# Patient Record
Sex: FEMALE | Race: WHITE | URBAN - METROPOLITAN AREA
[De-identification: names, ages, dates, MRNs, and addresses within clinical notes are randomized per-mention and may not be internally consistent; named-entity substitution may affect disease eponyms.]

---

## 2021-09-20 ENCOUNTER — OFFICE VISIT (OUTPATIENT)
Dept: URBAN - METROPOLITAN AREA CLINIC 51 | Facility: CLINIC | Age: 61
End: 2021-09-20
Payer: COMMERCIAL

## 2021-09-20 DIAGNOSIS — H40.1131 PRIMARY OPEN-ANGLE GLAUCOMA, MILD STAGE, BILATERAL: ICD-10-CM

## 2021-09-20 DIAGNOSIS — H34.8120 CENTRAL RETINAL VEIN OCCLUSION W/ MACULAR EDEMA, LEFT EYE: Primary | ICD-10-CM

## 2021-09-20 PROCEDURE — 67028 INJECTION EYE DRUG: CPT | Performed by: OPHTHALMOLOGY

## 2021-09-20 PROCEDURE — 99204 OFFICE O/P NEW MOD 45 MIN: CPT | Performed by: OPHTHALMOLOGY

## 2021-09-20 PROCEDURE — 92235 FLUORESCEIN ANGRPH MLTIFRAME: CPT | Performed by: OPHTHALMOLOGY

## 2021-09-20 PROCEDURE — 92134 CPTRZ OPH DX IMG PST SGM RTA: CPT | Performed by: OPHTHALMOLOGY

## 2021-09-20 RX ORDER — TRAVOPROST OPHTHALMIC SOLUTION, 0.004% 0.04 MG/ML
0.004 % SOLUTION/ DROPS OPHTHALMIC
Qty: 5 | Refills: 12 | Status: ACTIVE
Start: 2021-09-20

## 2021-09-20 ASSESSMENT — INTRAOCULAR PRESSURE
OS: 15
OD: 12

## 2021-09-20 NOTE — IMPRESSION/PLAN
Impression: Central Retinal Vein Occlusion, OS. Plan: Diagnosed in 2017. Vision is stable. Exam, OCT, and FA reveal a CRVO with CME. Inject Avastin, as necessary. Consider SDM MicroPulse laser. Schedule: 6 Week Reeval, Dil OU, OCT, RNFL. Schedule: Glaucoma consult.

## 2021-09-20 NOTE — IMPRESSION/PLAN
Impression: Glaucoma, OU. Plan: Travatan OU, Considered a suspect, will establish glaucoma care here.

## 2021-12-01 ENCOUNTER — OFFICE VISIT (OUTPATIENT)
Dept: URBAN - METROPOLITAN AREA CLINIC 51 | Facility: CLINIC | Age: 61
End: 2021-12-01
Payer: COMMERCIAL

## 2021-12-01 PROCEDURE — 92134 CPTRZ OPH DX IMG PST SGM RTA: CPT | Performed by: OPHTHALMOLOGY

## 2021-12-01 PROCEDURE — 99214 OFFICE O/P EST MOD 30 MIN: CPT | Performed by: OPHTHALMOLOGY

## 2021-12-01 ASSESSMENT — INTRAOCULAR PRESSURE
OS: 22
OD: 22

## 2021-12-01 NOTE — IMPRESSION/PLAN
Impression: Central Retinal Vein Occlusion, OS.
s/p Avastin 09/20/21 Plan: Diagnosed in 2017. Vision is stable. Exam, OCT, and FA reveal a CRVO with CME. Continue Avastin, as necessary. Schedule SDM MicroPulse laser. Schedule: 1-4 Week, Dil OS, OCT, RNFL, Laser 68081 OS.

## 2022-01-19 ENCOUNTER — OFFICE VISIT (OUTPATIENT)
Dept: URBAN - METROPOLITAN AREA CLINIC 51 | Facility: CLINIC | Age: 62
End: 2022-01-19
Payer: COMMERCIAL

## 2022-01-19 PROCEDURE — 92134 CPTRZ OPH DX IMG PST SGM RTA: CPT | Performed by: OPHTHALMOLOGY

## 2022-01-19 PROCEDURE — 99214 OFFICE O/P EST MOD 30 MIN: CPT | Performed by: OPHTHALMOLOGY

## 2022-01-19 PROCEDURE — 67028 INJECTION EYE DRUG: CPT | Performed by: OPHTHALMOLOGY

## 2022-01-19 ASSESSMENT — INTRAOCULAR PRESSURE
OS: 19
OD: 20

## 2022-01-19 NOTE — IMPRESSION/PLAN
Impression: Central Retinal Vein Occlusion, OS.
s/p Avastin 09/20/21 Plan: Diagnosed in 2017. Vision is stable. Exam, OCT, and FA reveal a CRVO with CME. Inject Avastin, FOR CME. Hold off on SDM MicroPulse laser. 

Schedule: 2 MONTHS , Dil OS, OCT, RNFL, AVASTIN

## 2022-04-07 ENCOUNTER — PROCEDURE (OUTPATIENT)
Dept: URBAN - METROPOLITAN AREA CLINIC 51 | Facility: CLINIC | Age: 62
End: 2022-04-07
Payer: COMMERCIAL

## 2022-04-07 PROCEDURE — 92134 CPTRZ OPH DX IMG PST SGM RTA: CPT | Performed by: OPHTHALMOLOGY

## 2022-04-07 PROCEDURE — 99214 OFFICE O/P EST MOD 30 MIN: CPT | Performed by: OPHTHALMOLOGY

## 2022-04-07 ASSESSMENT — INTRAOCULAR PRESSURE
OS: 18
OD: 15

## 2022-04-07 NOTE — IMPRESSION/PLAN
Impression: Central Retinal Vein Occlusion, OS.
s/p Avastin 09/20/21 Plan: Diagnosed in 2017. Vision is stable. Exam, OCT, and FA reveal a CRVO with CME. Inject Avastin, FOR CME doing well . Hold off on SDM MicroPulse laser.

## 2022-07-06 ENCOUNTER — OFFICE VISIT (OUTPATIENT)
Dept: URBAN - METROPOLITAN AREA CLINIC 51 | Facility: CLINIC | Age: 62
End: 2022-07-06
Payer: COMMERCIAL

## 2022-07-06 DIAGNOSIS — H40.1131 PRIMARY OPEN-ANGLE GLAUCOMA, MILD STAGE, BILATERAL: ICD-10-CM

## 2022-07-06 DIAGNOSIS — H34.8120 CENTRAL RETINAL VEIN OCCLUSION W/ MACULAR EDEMA, LEFT EYE: Primary | ICD-10-CM

## 2022-07-06 PROCEDURE — 92134 CPTRZ OPH DX IMG PST SGM RTA: CPT | Performed by: OPHTHALMOLOGY

## 2022-07-06 PROCEDURE — 92235 FLUORESCEIN ANGRPH MLTIFRAME: CPT | Performed by: OPHTHALMOLOGY

## 2022-07-06 PROCEDURE — 99212 OFFICE O/P EST SF 10 MIN: CPT | Performed by: OPHTHALMOLOGY

## 2022-07-06 PROCEDURE — 67028 INJECTION EYE DRUG: CPT | Performed by: OPHTHALMOLOGY

## 2022-07-06 ASSESSMENT — INTRAOCULAR PRESSURE
OS: 21
OD: 17

## 2022-07-06 NOTE — IMPRESSION/PLAN
Impression: Central Retinal Vein Occlusion, OS.
s/p Avastin 09/20/21 Plan: Diagnosed in 2017. Vision is stable. Exam, OCT, and FA reveal a CRVO with CME. Inject Avastin, FOR CME doing well. cost of laser an obstacle, plus past cancer later SDM MicroPulse laser.

## 2022-09-28 ENCOUNTER — OFFICE VISIT (OUTPATIENT)
Dept: URBAN - METROPOLITAN AREA CLINIC 51 | Facility: CLINIC | Age: 62
End: 2022-09-28
Payer: COMMERCIAL

## 2022-09-28 DIAGNOSIS — H40.1131 PRIMARY OPEN-ANGLE GLAUCOMA, MILD STAGE, BILATERAL: ICD-10-CM

## 2022-09-28 DIAGNOSIS — H34.8120 CENTRAL RETINAL VEIN OCCLUSION W/ MACULAR EDEMA, LEFT EYE: Primary | ICD-10-CM

## 2022-09-28 PROCEDURE — 99212 OFFICE O/P EST SF 10 MIN: CPT | Performed by: OPHTHALMOLOGY

## 2022-09-28 PROCEDURE — 67028 INJECTION EYE DRUG: CPT | Performed by: OPHTHALMOLOGY

## 2022-09-28 PROCEDURE — 92134 CPTRZ OPH DX IMG PST SGM RTA: CPT | Performed by: OPHTHALMOLOGY

## 2022-09-28 ASSESSMENT — INTRAOCULAR PRESSURE
OS: 22
OD: 21

## 2022-09-28 NOTE — IMPRESSION/PLAN
Impression: Central Retinal Vein Occlusion, OS. Plan: Diagnosed in 2017. Vision is stable. Exam, OCT, and FA reveal a CRVO with CME. Inject Avastin OS for CME.  I suggest laser micropulse as adjunct for CME CRVO

## 2022-09-28 NOTE — IMPRESSION/PLAN
Impression: Glaucoma, OU.  Plan: Travatan OU, Considered a suspect, will establish glaucoma care here,  [facility]

## 2022-11-18 ENCOUNTER — OFFICE VISIT (OUTPATIENT)
Dept: URBAN - METROPOLITAN AREA CLINIC 51 | Facility: CLINIC | Age: 62
End: 2022-11-18
Payer: COMMERCIAL

## 2022-11-18 DIAGNOSIS — H35.352 CYSTOID MACULAR DEGENERATION, LEFT EYE: ICD-10-CM

## 2022-11-18 DIAGNOSIS — H25.13 AGE-RELATED NUCLEAR CATARACT, BILATERAL: ICD-10-CM

## 2022-11-18 DIAGNOSIS — H40.1121 PRIMARY OPEN-ANGLE GLAUCOMA, MILD STAGE, LEFT EYE: Primary | ICD-10-CM

## 2022-11-18 DIAGNOSIS — H40.051 OCULAR HYPERTENSION, RIGHT EYE: ICD-10-CM

## 2022-11-18 PROCEDURE — 76514 ECHO EXAM OF EYE THICKNESS: CPT | Performed by: STUDENT IN AN ORGANIZED HEALTH CARE EDUCATION/TRAINING PROGRAM

## 2022-11-18 PROCEDURE — 92083 EXTENDED VISUAL FIELD XM: CPT | Performed by: STUDENT IN AN ORGANIZED HEALTH CARE EDUCATION/TRAINING PROGRAM

## 2022-11-18 PROCEDURE — 99214 OFFICE O/P EST MOD 30 MIN: CPT | Performed by: STUDENT IN AN ORGANIZED HEALTH CARE EDUCATION/TRAINING PROGRAM

## 2022-11-18 PROCEDURE — 92020 GONIOSCOPY: CPT | Performed by: STUDENT IN AN ORGANIZED HEALTH CARE EDUCATION/TRAINING PROGRAM

## 2022-11-18 PROCEDURE — 92133 CPTRZD OPH DX IMG PST SGM ON: CPT | Performed by: STUDENT IN AN ORGANIZED HEALTH CARE EDUCATION/TRAINING PROGRAM

## 2022-11-18 RX ORDER — DORZOLAMIDE HYDROCHLORIDE AND TIMOLOL MALEATE 20; 5 MG/ML; MG/ML
SOLUTION/ DROPS OPHTHALMIC
Qty: 10 | Refills: 5 | Status: ACTIVE
Start: 2022-11-18

## 2022-11-18 ASSESSMENT — INTRAOCULAR PRESSURE
OS: 23
OD: 21

## 2022-11-18 NOTE — IMPRESSION/PLAN
Impression: Primary open-angle glaucoma, mild stage, left eye: H40.1121. Preperimetric Plan: Pachy: 554/550 Tmax:  22/23 Target IOP: high teens OU Med Allergies: none Heart / Lung / Kidney disease/sulfa allergy: Pt. denies Gonioscopy: grade 3, 2+ TMP OU
OCT: 86 normal/80 sup thinning HVF: MD -0 full ou
C/D: .4/.6 Better seeing eye:  OD
IOP Today: 21/23 Plan: IOP is above target. Explained to patient that based off testing and IOP early glaucoma present OS. Recommend lowering IOP. Drops: STOP Travoprost (CME), START Dorzolamide-Timolol BID OU. Discussed natural history of glaucoma and that irreversible vision loss can occur without adequate IOP control. Emphasized compliance with eyedrops and other treatment described above.

## 2022-11-30 ENCOUNTER — OFFICE VISIT (OUTPATIENT)
Dept: URBAN - METROPOLITAN AREA CLINIC 51 | Facility: CLINIC | Age: 62
End: 2022-11-30
Payer: COMMERCIAL

## 2022-11-30 DIAGNOSIS — H40.1131 PRIMARY OPEN-ANGLE GLAUCOMA, MILD STAGE, BILATERAL: ICD-10-CM

## 2022-11-30 DIAGNOSIS — H34.8120 CENTRAL RETINAL VEIN OCCLUSION W/ MACULAR EDEMA, LEFT EYE: Primary | ICD-10-CM

## 2022-11-30 PROCEDURE — 99212 OFFICE O/P EST SF 10 MIN: CPT | Performed by: OPHTHALMOLOGY

## 2022-11-30 PROCEDURE — 92134 CPTRZ OPH DX IMG PST SGM RTA: CPT | Performed by: OPHTHALMOLOGY

## 2022-11-30 PROCEDURE — 67028 INJECTION EYE DRUG: CPT | Performed by: OPHTHALMOLOGY

## 2022-11-30 PROCEDURE — 92235 FLUORESCEIN ANGRPH MLTIFRAME: CPT | Performed by: OPHTHALMOLOGY

## 2022-11-30 ASSESSMENT — INTRAOCULAR PRESSURE
OD: 8
OS: 10

## 2022-11-30 NOTE — IMPRESSION/PLAN
Impression: Central Retinal Vein Occlusion, OS. Plan: Diagnosed in 2017. Vision is stable. Exam, OCT, and FA reveal a CRVO with CME. Inject Avastin OS for CME, today . I suggest laser micropulse as adjunct for CME CRVO, [id number] micropulse.

## 2022-11-30 NOTE — IMPRESSION/PLAN
Impression: Glaucoma, OU.  Plan:  OU, Considered a suspect, will establish glaucoma care here,  [facility]

## 2023-01-13 ENCOUNTER — OFFICE VISIT (OUTPATIENT)
Dept: URBAN - METROPOLITAN AREA CLINIC 51 | Facility: CLINIC | Age: 63
End: 2023-01-13
Payer: COMMERCIAL

## 2023-01-13 DIAGNOSIS — H40.051 OCULAR HYPERTENSION, RIGHT EYE: ICD-10-CM

## 2023-01-13 DIAGNOSIS — H40.1121 PRIMARY OPEN-ANGLE GLAUCOMA, MILD STAGE, LEFT EYE: Primary | ICD-10-CM

## 2023-01-13 PROCEDURE — 99213 OFFICE O/P EST LOW 20 MIN: CPT | Performed by: STUDENT IN AN ORGANIZED HEALTH CARE EDUCATION/TRAINING PROGRAM

## 2023-01-13 ASSESSMENT — INTRAOCULAR PRESSURE
OS: 18
OD: 17

## 2023-01-13 NOTE — IMPRESSION/PLAN
Impression: Primary open-angle glaucoma, mild stage, left eye: H40.1121. Preperimetric
- AVOID PGAs(CME) Plan: Pachy: 554/550 Tmax:  22/23 Target IOP: high teens OU Med Allergies: none Heart / Lung / Kidney disease/sulfa allergy: Pt. denies Gonioscopy: grade 3, 2+ TMP OU
OCT: 86 normal/80 sup thinning HVF: MD -0 full ou
C/D: .4/.6 Better seeing eye:  OD
IOP Today: 17/18 Plan: IOP is at goal OU. No changes being made with drop regimen. Will continue to monitor IOP. Drops: CONTINUE Dorzolamide-Timolol BID OU. Discussed natural history of glaucoma and that irreversible vision loss can occur without adequate IOP control. Emphasized compliance with eyedrops and other treatment described above.

## 2023-01-31 ENCOUNTER — OFFICE VISIT (OUTPATIENT)
Dept: URBAN - METROPOLITAN AREA CLINIC 51 | Facility: CLINIC | Age: 63
End: 2023-01-31
Payer: COMMERCIAL

## 2023-01-31 DIAGNOSIS — H34.8120 CENTRAL RETINAL VEIN OCCLUSION W/ MACULAR EDEMA, LEFT EYE: Primary | ICD-10-CM

## 2023-01-31 PROCEDURE — 67028 INJECTION EYE DRUG: CPT | Performed by: OPHTHALMOLOGY

## 2023-01-31 PROCEDURE — 92134 CPTRZ OPH DX IMG PST SGM RTA: CPT | Performed by: OPHTHALMOLOGY

## 2023-01-31 ASSESSMENT — INTRAOCULAR PRESSURE
OD: 18
OS: 17

## 2023-01-31 NOTE — IMPRESSION/PLAN
Impression: Central Retinal Vein Occlusion, OS. Plan: inject Avastin OS  **increased CME x 6 months, recommend return sooner RTC 1 month Dr Alda Love

## 2023-04-03 ENCOUNTER — OFFICE VISIT (OUTPATIENT)
Dept: URBAN - METROPOLITAN AREA CLINIC 51 | Facility: CLINIC | Age: 63
End: 2023-04-03
Payer: COMMERCIAL

## 2023-04-03 DIAGNOSIS — H40.1131 PRIMARY OPEN-ANGLE GLAUCOMA, MILD STAGE, BILATERAL: ICD-10-CM

## 2023-04-03 DIAGNOSIS — H34.8120 CENTRAL RETINAL VEIN OCCLUSION W/ MACULAR EDEMA, LEFT EYE: Primary | ICD-10-CM

## 2023-04-03 PROCEDURE — 99212 OFFICE O/P EST SF 10 MIN: CPT | Performed by: OPHTHALMOLOGY

## 2023-04-03 PROCEDURE — 92235 FLUORESCEIN ANGRPH MLTIFRAME: CPT | Performed by: OPHTHALMOLOGY

## 2023-04-03 PROCEDURE — 67028 INJECTION EYE DRUG: CPT | Performed by: OPHTHALMOLOGY

## 2023-04-03 PROCEDURE — 92134 CPTRZ OPH DX IMG PST SGM RTA: CPT | Performed by: OPHTHALMOLOGY

## 2023-04-03 ASSESSMENT — INTRAOCULAR PRESSURE
OS: 10
OD: 12

## 2023-04-03 NOTE — IMPRESSION/PLAN
Impression: Glaucoma, OU.  Plan: OU, Considered a suspect, will establish glaucoma care here,  [facility]

## 2023-04-03 NOTE — IMPRESSION/PLAN
Impression: Central Retinal Vein Occlusion, OS. Plan: Diagnosed in 2017. Vision is stable. Exam, OCT, and FA reveal a CRVO with  resolved CME. Inject Avastin OS for CME, today . I suggest laser micropulse as adjunct for CME CRVO, [id number] micropulse.

## 2023-06-01 ENCOUNTER — OFFICE VISIT (OUTPATIENT)
Dept: URBAN - METROPOLITAN AREA CLINIC 51 | Facility: CLINIC | Age: 63
End: 2023-06-01
Payer: COMMERCIAL

## 2023-06-01 DIAGNOSIS — H34.8120 CENTRAL RETINAL VEIN OCCLUSION W/ MACULAR EDEMA, LEFT EYE: Primary | ICD-10-CM

## 2023-06-01 DIAGNOSIS — H40.1131 PRIMARY OPEN-ANGLE GLAUCOMA, MILD STAGE, BILATERAL: ICD-10-CM

## 2023-06-01 PROCEDURE — 92134 CPTRZ OPH DX IMG PST SGM RTA: CPT | Performed by: OPHTHALMOLOGY

## 2023-06-01 PROCEDURE — 99212 OFFICE O/P EST SF 10 MIN: CPT | Performed by: OPHTHALMOLOGY

## 2023-06-01 PROCEDURE — 67028 INJECTION EYE DRUG: CPT | Performed by: OPHTHALMOLOGY

## 2023-06-01 PROCEDURE — 67210 TREATMENT OF RETINAL LESION: CPT | Performed by: OPHTHALMOLOGY

## 2023-06-01 ASSESSMENT — INTRAOCULAR PRESSURE
OS: 13
OD: 14

## 2023-06-01 NOTE — IMPRESSION/PLAN
Impression: Glaucoma, OU.  Plan: OU, Considered a suspect, will establish glaucoma care here,  [facility]

## 2023-06-01 NOTE — IMPRESSION/PLAN
Impression: Central Retinal Vein Occlusion, OS. Plan: Diagnosed in 2017. Vision is stable. Exam, OCT, and FA reveal a CRVO with CME. Inject Avastin OS for CME, today .  I suggest laser micropulse as adjunct for CME CRVO, [id number] micropulse.,she will do in Jan 2023

## 2023-07-14 ENCOUNTER — OFFICE VISIT (OUTPATIENT)
Dept: URBAN - METROPOLITAN AREA CLINIC 51 | Facility: CLINIC | Age: 63
End: 2023-07-14
Payer: COMMERCIAL

## 2023-07-14 DIAGNOSIS — H40.051 OCULAR HYPERTENSION, RIGHT EYE: ICD-10-CM

## 2023-07-14 DIAGNOSIS — H40.1121 PRIMARY OPEN-ANGLE GLAUCOMA, MILD STAGE, LEFT EYE: Primary | ICD-10-CM

## 2023-07-14 PROCEDURE — 99213 OFFICE O/P EST LOW 20 MIN: CPT | Performed by: STUDENT IN AN ORGANIZED HEALTH CARE EDUCATION/TRAINING PROGRAM

## 2023-07-14 PROCEDURE — 92083 EXTENDED VISUAL FIELD XM: CPT | Performed by: STUDENT IN AN ORGANIZED HEALTH CARE EDUCATION/TRAINING PROGRAM

## 2023-07-14 ASSESSMENT — INTRAOCULAR PRESSURE
OS: 14
OD: 15

## 2023-07-14 NOTE — IMPRESSION/PLAN
Impression: Primary open-angle glaucoma, mild stage, left eye: H40.1121. Preperimetric
- AVOID PGAs(CME) Plan: Pachy: 554/550 Tmax:  22/23 Target IOP: high teens OU Med Allergies: none Heart / Lung / Kidney disease/sulfa allergy: Pt. denies Gonioscopy: grade 3, 2+ TMP OU
OCT: 86 normal/80 sup thinning HVF: OD: MD -0 full (excessive high false pos)/OS: MD -2 EBS, grossly full C/D: .4/.6 Better seeing eye:  OD
IOP Today: 15/14 Plan: IOP and VF are stable OU. No changes are being made to treatment at this time. Will continue to monitor IOP and testing. Drops: CONTINUE Dorzolamide-Timolol BID OU. Discussed natural history of glaucoma and that irreversible vision loss can occur without adequate IOP control. Emphasized compliance with eyedrops and other treatment described above.

## 2023-08-02 ENCOUNTER — OFFICE VISIT (OUTPATIENT)
Dept: URBAN - METROPOLITAN AREA CLINIC 51 | Facility: CLINIC | Age: 63
End: 2023-08-02
Payer: COMMERCIAL

## 2023-08-02 DIAGNOSIS — H40.1131 PRIMARY OPEN-ANGLE GLAUCOMA, MILD STAGE, BILATERAL: ICD-10-CM

## 2023-08-02 DIAGNOSIS — H34.8120 CENTRAL RETINAL VEIN OCCLUSION W/ MACULAR EDEMA, LEFT EYE: Primary | ICD-10-CM

## 2023-08-02 PROCEDURE — 67028 INJECTION EYE DRUG: CPT | Performed by: OPHTHALMOLOGY

## 2023-08-02 PROCEDURE — 99212 OFFICE O/P EST SF 10 MIN: CPT | Performed by: OPHTHALMOLOGY

## 2023-08-02 PROCEDURE — 92134 CPTRZ OPH DX IMG PST SGM RTA: CPT | Performed by: OPHTHALMOLOGY

## 2023-08-02 ASSESSMENT — INTRAOCULAR PRESSURE
OS: 16
OD: 14

## 2023-11-16 ENCOUNTER — OFFICE VISIT (OUTPATIENT)
Dept: URBAN - METROPOLITAN AREA CLINIC 51 | Facility: CLINIC | Age: 63
End: 2023-11-16
Payer: COMMERCIAL

## 2023-11-16 DIAGNOSIS — H40.1131 PRIMARY OPEN-ANGLE GLAUCOMA, MILD STAGE, BILATERAL: ICD-10-CM

## 2023-11-16 PROCEDURE — 99212 OFFICE O/P EST SF 10 MIN: CPT | Performed by: OPHTHALMOLOGY

## 2023-11-16 PROCEDURE — 92134 CPTRZ OPH DX IMG PST SGM RTA: CPT | Performed by: OPHTHALMOLOGY

## 2023-11-16 PROCEDURE — 67028 INJECTION EYE DRUG: CPT | Performed by: OPHTHALMOLOGY

## 2024-02-05 ENCOUNTER — OFFICE VISIT (OUTPATIENT)
Dept: URBAN - METROPOLITAN AREA CLINIC 44 | Facility: CLINIC | Age: 64
End: 2024-02-05
Payer: COMMERCIAL

## 2024-02-05 DIAGNOSIS — H25.13 AGE-RELATED NUCLEAR CATARACT, BILATERAL: ICD-10-CM

## 2024-02-05 DIAGNOSIS — H40.051 OCULAR HYPERTENSION, RIGHT EYE: ICD-10-CM

## 2024-02-05 DIAGNOSIS — H34.8120 CENTRAL RETINAL VEIN OCCLUSION W/ MACULAR EDEMA, LEFT EYE: ICD-10-CM

## 2024-02-05 DIAGNOSIS — H04.123 DRY EYE SYNDROME OF BILATERAL LACRIMAL GLANDS: ICD-10-CM

## 2024-02-05 DIAGNOSIS — H40.1121 PRIMARY OPEN-ANGLE GLAUCOMA, MILD STAGE, LEFT EYE: Primary | ICD-10-CM

## 2024-02-05 PROCEDURE — 99213 OFFICE O/P EST LOW 20 MIN: CPT | Performed by: OPHTHALMOLOGY

## 2024-02-05 PROCEDURE — 92020 GONIOSCOPY: CPT | Performed by: OPHTHALMOLOGY

## 2024-02-05 RX ORDER — DORZOLAMIDE HYDROCHLORIDE AND TIMOLOL MALEATE 20; 5 MG/ML; MG/ML
SOLUTION/ DROPS OPHTHALMIC
Qty: 10 | Refills: 3 | Status: ACTIVE
Start: 2024-02-05

## 2024-02-05 ASSESSMENT — INTRAOCULAR PRESSURE
OD: 16
OS: 17

## 2024-03-05 ENCOUNTER — OFFICE VISIT (OUTPATIENT)
Dept: URBAN - METROPOLITAN AREA CLINIC 44 | Facility: CLINIC | Age: 64
End: 2024-03-05
Payer: COMMERCIAL

## 2024-03-05 DIAGNOSIS — H34.8120 CENTRAL RETINAL VEIN OCCLUSION W/ MACULAR EDEMA, LEFT EYE: Primary | ICD-10-CM

## 2024-03-05 DIAGNOSIS — H40.1131 PRIMARY OPEN-ANGLE GLAUCOMA, MILD STAGE, BILATERAL: ICD-10-CM

## 2024-03-05 PROCEDURE — 99212 OFFICE O/P EST SF 10 MIN: CPT | Performed by: OPHTHALMOLOGY

## 2024-03-05 PROCEDURE — 67028 INJECTION EYE DRUG: CPT | Performed by: OPHTHALMOLOGY

## 2024-03-05 PROCEDURE — 92134 CPTRZ OPH DX IMG PST SGM RTA: CPT | Performed by: OPHTHALMOLOGY

## 2024-03-05 ASSESSMENT — INTRAOCULAR PRESSURE
OS: 21
OD: 22

## 2024-05-09 ENCOUNTER — OFFICE VISIT (OUTPATIENT)
Dept: URBAN - METROPOLITAN AREA CLINIC 44 | Facility: CLINIC | Age: 64
End: 2024-05-09
Payer: COMMERCIAL

## 2024-05-09 DIAGNOSIS — H43.813 VITREOUS DEGENERATION, BILATERAL: ICD-10-CM

## 2024-05-09 DIAGNOSIS — H34.8120 CENTRAL RETINAL VEIN OCCLUSION W/ MACULAR EDEMA, LEFT EYE: Primary | ICD-10-CM

## 2024-05-09 PROCEDURE — 67028 INJECTION EYE DRUG: CPT | Performed by: OPHTHALMOLOGY

## 2024-05-09 PROCEDURE — 99214 OFFICE O/P EST MOD 30 MIN: CPT | Performed by: OPHTHALMOLOGY

## 2024-05-09 PROCEDURE — 92134 CPTRZ OPH DX IMG PST SGM RTA: CPT | Performed by: OPHTHALMOLOGY

## 2024-05-09 ASSESSMENT — INTRAOCULAR PRESSURE
OS: 15
OD: 17

## 2024-06-27 ENCOUNTER — OFFICE VISIT (OUTPATIENT)
Dept: URBAN - METROPOLITAN AREA CLINIC 44 | Facility: CLINIC | Age: 64
End: 2024-06-27
Payer: COMMERCIAL

## 2024-06-27 DIAGNOSIS — H34.8120 CENTRAL RETINAL VEIN OCCLUSION W/ MACULAR EDEMA, LEFT EYE: Primary | ICD-10-CM

## 2024-06-27 PROCEDURE — 67028 INJECTION EYE DRUG: CPT | Performed by: OPHTHALMOLOGY

## 2024-06-27 PROCEDURE — 92134 CPTRZ OPH DX IMG PST SGM RTA: CPT | Performed by: OPHTHALMOLOGY

## 2024-06-27 ASSESSMENT — INTRAOCULAR PRESSURE
OS: 16
OD: 16

## 2024-08-05 ENCOUNTER — OFFICE VISIT (OUTPATIENT)
Dept: URBAN - METROPOLITAN AREA CLINIC 44 | Facility: CLINIC | Age: 64
End: 2024-08-05
Payer: COMMERCIAL

## 2024-08-05 DIAGNOSIS — H04.123 DRY EYE SYNDROME OF BILATERAL LACRIMAL GLANDS: ICD-10-CM

## 2024-08-05 DIAGNOSIS — H40.051 OCULAR HYPERTENSION, RIGHT EYE: ICD-10-CM

## 2024-08-05 DIAGNOSIS — H25.13 AGE-RELATED NUCLEAR CATARACT, BILATERAL: ICD-10-CM

## 2024-08-05 DIAGNOSIS — H40.1121 PRIMARY OPEN-ANGLE GLAUCOMA, MILD STAGE, LEFT EYE: Primary | ICD-10-CM

## 2024-08-05 DIAGNOSIS — H34.8120 CENTRAL RETINAL VEIN OCCLUSION W/ MACULAR EDEMA, LEFT EYE: ICD-10-CM

## 2024-08-05 PROCEDURE — 92082 INTERMEDIATE VISUAL FIELD XM: CPT | Performed by: OPHTHALMOLOGY

## 2024-08-05 PROCEDURE — 99213 OFFICE O/P EST LOW 20 MIN: CPT | Performed by: OPHTHALMOLOGY

## 2024-08-05 PROCEDURE — 92133 CPTRZD OPH DX IMG PST SGM ON: CPT | Performed by: OPHTHALMOLOGY

## 2024-08-05 RX ORDER — DORZOLAMIDE HYDROCHLORIDE AND TIMOLOL MALEATE 20; 5 MG/ML; MG/ML
SOLUTION/ DROPS OPHTHALMIC
Qty: 15 | Refills: 1 | Status: ACTIVE
Start: 2024-08-05

## 2024-08-05 ASSESSMENT — INTRAOCULAR PRESSURE
OD: 16
OS: 18

## 2024-08-12 ENCOUNTER — OFFICE VISIT (OUTPATIENT)
Dept: URBAN - METROPOLITAN AREA CLINIC 44 | Facility: CLINIC | Age: 64
End: 2024-08-12
Payer: COMMERCIAL

## 2024-08-12 DIAGNOSIS — H34.8120 CENTRAL RETINAL VEIN OCCLUSION W/ MACULAR EDEMA, LEFT EYE: Primary | ICD-10-CM

## 2024-08-12 PROCEDURE — 92134 CPTRZ OPH DX IMG PST SGM RTA: CPT | Performed by: OPHTHALMOLOGY

## 2024-08-12 PROCEDURE — 67028 INJECTION EYE DRUG: CPT | Performed by: OPHTHALMOLOGY

## 2024-08-12 ASSESSMENT — INTRAOCULAR PRESSURE
OS: 15
OD: 15

## 2024-09-23 ENCOUNTER — OFFICE VISIT (OUTPATIENT)
Dept: URBAN - METROPOLITAN AREA CLINIC 44 | Facility: CLINIC | Age: 64
End: 2024-09-23
Payer: COMMERCIAL

## 2024-09-23 DIAGNOSIS — H34.8120 CENTRAL RETINAL VEIN OCCLUSION W/ MACULAR EDEMA, LEFT EYE: Primary | ICD-10-CM

## 2024-09-23 DIAGNOSIS — H43.813 VITREOUS DEGENERATION, BILATERAL: ICD-10-CM

## 2024-09-23 PROCEDURE — 99214 OFFICE O/P EST MOD 30 MIN: CPT | Performed by: OPHTHALMOLOGY

## 2024-09-23 PROCEDURE — 92134 CPTRZ OPH DX IMG PST SGM RTA: CPT | Performed by: OPHTHALMOLOGY

## 2024-09-23 PROCEDURE — 67028 INJECTION EYE DRUG: CPT | Performed by: OPHTHALMOLOGY

## 2024-09-23 ASSESSMENT — INTRAOCULAR PRESSURE
OD: 15
OS: 17

## 2024-12-03 ENCOUNTER — OFFICE VISIT (OUTPATIENT)
Dept: URBAN - METROPOLITAN AREA CLINIC 51 | Facility: CLINIC | Age: 64
End: 2024-12-03
Payer: COMMERCIAL

## 2024-12-03 DIAGNOSIS — H34.8120 CENTRAL RETINAL VEIN OCCLUSION W/ MACULAR EDEMA, LEFT EYE: Primary | ICD-10-CM

## 2024-12-03 PROCEDURE — 92134 CPTRZ OPH DX IMG PST SGM RTA: CPT | Performed by: OPHTHALMOLOGY

## 2024-12-03 PROCEDURE — 67028 INJECTION EYE DRUG: CPT | Performed by: OPHTHALMOLOGY

## 2024-12-03 ASSESSMENT — INTRAOCULAR PRESSURE
OD: 14
OS: 13

## 2025-01-14 ENCOUNTER — OFFICE VISIT (OUTPATIENT)
Dept: URBAN - METROPOLITAN AREA CLINIC 51 | Facility: CLINIC | Age: 65
End: 2025-01-14
Payer: COMMERCIAL

## 2025-01-14 DIAGNOSIS — H34.8120 CENTRAL RETINAL VEIN OCCLUSION, LEFT EYE, WITH MACULAR EDEMA: Primary | ICD-10-CM

## 2025-01-14 DIAGNOSIS — H43.813 VITREOUS DEGENERATION, BILATERAL: ICD-10-CM

## 2025-01-14 PROCEDURE — 92134 CPTRZ OPH DX IMG PST SGM RTA: CPT | Performed by: OPHTHALMOLOGY

## 2025-01-14 PROCEDURE — 67028 INJECTION EYE DRUG: CPT | Performed by: OPHTHALMOLOGY

## 2025-01-14 ASSESSMENT — INTRAOCULAR PRESSURE
OD: 15
OS: 15

## 2025-03-07 ENCOUNTER — OFFICE VISIT (OUTPATIENT)
Dept: URBAN - METROPOLITAN AREA CLINIC 51 | Facility: CLINIC | Age: 65
End: 2025-03-07
Payer: COMMERCIAL

## 2025-03-07 DIAGNOSIS — H40.1121 PRIMARY OPEN-ANGLE GLAUCOMA, MILD STAGE, LEFT EYE: Primary | ICD-10-CM

## 2025-03-07 DIAGNOSIS — H34.8120 CENTRAL RETINAL VEIN OCCLUSION W/ MACULAR EDEMA, LEFT EYE: ICD-10-CM

## 2025-03-07 DIAGNOSIS — H40.051 OCULAR HYPERTENSION, RIGHT EYE: ICD-10-CM

## 2025-03-07 RX ORDER — DORZOLAMIDE HYDROCHLORIDE AND TIMOLOL MALEATE 20; 5 MG/ML; MG/ML
SOLUTION/ DROPS OPHTHALMIC
Qty: 30 | Refills: 2 | Status: ACTIVE
Start: 2025-03-07

## 2025-03-07 ASSESSMENT — INTRAOCULAR PRESSURE
OD: 18
OS: 18

## 2025-04-08 ENCOUNTER — OFFICE VISIT (OUTPATIENT)
Dept: URBAN - METROPOLITAN AREA CLINIC 51 | Facility: CLINIC | Age: 65
End: 2025-04-08
Payer: COMMERCIAL

## 2025-04-08 DIAGNOSIS — H34.8120 CENTRAL RETINAL VEIN OCCLUSION, LEFT EYE, WITH MACULAR EDEMA: Primary | ICD-10-CM

## 2025-04-08 PROCEDURE — 92134 CPTRZ OPH DX IMG PST SGM RTA: CPT | Performed by: OPHTHALMOLOGY

## 2025-04-08 PROCEDURE — 67028 INJECTION EYE DRUG: CPT | Performed by: OPHTHALMOLOGY

## 2025-04-08 ASSESSMENT — INTRAOCULAR PRESSURE
OS: 26
OD: 21

## 2025-05-20 ENCOUNTER — OFFICE VISIT (OUTPATIENT)
Dept: URBAN - METROPOLITAN AREA CLINIC 51 | Facility: CLINIC | Age: 65
End: 2025-05-20
Payer: COMMERCIAL

## 2025-05-20 DIAGNOSIS — H34.8120 CENTRAL RETINAL VEIN OCCLUSION, LEFT EYE, WITH MACULAR EDEMA: Primary | ICD-10-CM

## 2025-05-20 PROCEDURE — 67028 INJECTION EYE DRUG: CPT | Performed by: OPHTHALMOLOGY

## 2025-05-20 PROCEDURE — 92134 CPTRZ OPH DX IMG PST SGM RTA: CPT | Performed by: OPHTHALMOLOGY

## 2025-05-20 ASSESSMENT — INTRAOCULAR PRESSURE
OS: 19
OD: 21

## 2025-07-01 ENCOUNTER — OFFICE VISIT (OUTPATIENT)
Dept: URBAN - METROPOLITAN AREA CLINIC 51 | Facility: CLINIC | Age: 65
End: 2025-07-01
Payer: COMMERCIAL

## 2025-07-01 DIAGNOSIS — H43.813 VITREOUS DEGENERATION, BILATERAL: ICD-10-CM

## 2025-07-01 DIAGNOSIS — H34.8120 CENTRAL RETINAL VEIN OCCLUSION, LEFT EYE, WITH MACULAR EDEMA: Primary | ICD-10-CM

## 2025-07-01 PROCEDURE — 99214 OFFICE O/P EST MOD 30 MIN: CPT | Performed by: OPHTHALMOLOGY

## 2025-07-01 PROCEDURE — 67028 INJECTION EYE DRUG: CPT | Performed by: OPHTHALMOLOGY

## 2025-07-01 PROCEDURE — 92134 CPTRZ OPH DX IMG PST SGM RTA: CPT | Performed by: OPHTHALMOLOGY

## 2025-07-01 ASSESSMENT — INTRAOCULAR PRESSURE
OS: 23
OD: 18

## 2025-08-26 ENCOUNTER — OFFICE VISIT (OUTPATIENT)
Dept: URBAN - METROPOLITAN AREA CLINIC 51 | Facility: CLINIC | Age: 65
End: 2025-08-26
Payer: COMMERCIAL

## 2025-08-26 DIAGNOSIS — H34.8120 CENTRAL RETINAL VEIN OCCLUSION, LEFT EYE, WITH MACULAR EDEMA: Primary | ICD-10-CM

## 2025-08-26 PROCEDURE — 67028 INJECTION EYE DRUG: CPT | Performed by: OPHTHALMOLOGY

## 2025-08-26 PROCEDURE — 92134 CPTRZ OPH DX IMG PST SGM RTA: CPT | Performed by: OPHTHALMOLOGY

## 2025-08-26 ASSESSMENT — INTRAOCULAR PRESSURE
OS: 22
OD: 20